# Patient Record
(demographics unavailable — no encounter records)

---

## 2024-12-04 NOTE — PLAN
[FreeTextEntry1] : I gave the patient Mycolog cream for her intertrigo.  The patient can continue to use Revaree as needed but cannot use vaginal estradiol cream because of her breast cancer.  She is scheduled for a pelvic sonogram with Dr. Arsenio Hall in 1 month.  She should return in 1 year.

## 2024-12-04 NOTE — ASSESSMENT
[TextEntry] : The patient is no longer sexually active and her introitus no longer admits two examining fingers. A pelvic sonogram last year showed a slight increase in the amount of endometrial fluid but the endometrial echo is less than 2 mm.  A repeat pelvic sonogram is scheduled with Dr. Arsenio Hall next month.  She has intertrigo under her breasts. 25 minutes of total time was spent on the date of the encounter. This included time for review of medical records and laboratory results, face to face time (including the physical examination counseling and coordination of care), time for patient education, treatment plans, answering questions, communicating with other doctors and for medical record documentation. The time spent is separate from time spent on separately billed procedures.

## 2024-12-04 NOTE — PHYSICAL EXAM
[Chaperone Present] : A chaperone was present in the examining room during all aspects of the physical examination [TextEntry] : ***General***  General Appearance: normal; Judgment and insight: normal; the patient is oriented to time, place and person; Recent and remote memory: normal; Mood and affect: normal; Respiratory effort: normal.  ***Pelvic Examination*** External genitalia: The appearance and hair distribution are normal; no lesions are appreciated. Urethra/urethral meatus: No masses or tenderness are appreciated; there is no scarring noted. Bladder: nontender; there is no fullness appreciated; no masses were palpated. Vagina: the vagina is markedly atrophic; no abnormal discharge is seen; no lesions are seen; pelvic support is adequate without any evidence of cystocele or rectocele. Cervix: normal in appearance; no overt lesions are seen. the os is obliterated. Uterus: retroverted; normal in size, mobile, nontender, and well supported. Adnexa/parametria: nontender and not enlarged; no masses are palpated. A stool specimen was not indicated at this time.  ***colposcopy of the vulva*** Colposcopy of the external genitalia showed that the labia majora and labia minora were normal. She identified the introitus as the location of her pain/itching/irritation. There was no vestibular tenderness of the anterior minor vestibular glands, and no vestibular tenderness of the posterior minor vestibular glands. There was no evidence of other dermatopathology. There was no erythema of the introitus. There was moderate vulvar atrophy at the introitus. Her introitus no longer admits two examining fingers.

## 2024-12-04 NOTE — HISTORY OF PRESENT ILLNESS
[FreeTextEntry1] : The patient continues to use Revaree periodic. She is no longer sexually active due to her 's prostate issues and side effects from his medication. The patient has cervical os obliteration. In December 2023, she had a pelvic sonogram that showed a slight increase in the amount of endometrial fluid but the endometrial echo itself was thin.  She is scheduled for a pelvic sonogram by Dr. Arsenio Hall next month.  Of note, the patient continues to have intertrigo under her breasts that responds well to Mycolog.